# Patient Record
Sex: MALE | Employment: FULL TIME | URBAN - METROPOLITAN AREA
[De-identification: names, ages, dates, MRNs, and addresses within clinical notes are randomized per-mention and may not be internally consistent; named-entity substitution may affect disease eponyms.]

---

## 2023-07-26 ENCOUNTER — TELEPHONE (OUTPATIENT)
Age: 58
End: 2023-07-26

## 2023-07-26 NOTE — TELEPHONE ENCOUNTER
----- Message from Tiera Stewart sent at 7/26/2023 10:11 AM EDT -----  Subject: Message to Provider    QUESTIONS  Information for Provider? Pt was approved by provider to become a new pt,   his appt is scheduled for 8/4/23. He needs to have that appt rescheduled. I spoke to the  and she couldn't help me and said Diana would   need to handle the reschedule. I held but never got any answer and was   disconnected. Please call pt back to handle this. Thank you  ---------------------------------------------------------------------------  --------------  Estee SHARMA  [phone]; OK to leave message on voicemail  ---------------------------------------------------------------------------  --------------  SCRIPT ANSWERS  Relationship to Patient?  Self

## 2023-07-28 ENCOUNTER — TELEPHONE (OUTPATIENT)
Age: 58
End: 2023-07-28

## 2023-07-28 NOTE — TELEPHONE ENCOUNTER
----- Message from Kilo Sharma sent at 7/28/2023 11:45 AM EDT -----  Subject: Message to Provider    QUESTIONS  Information for Provider? returning call to the office to [employer].  please   return the call  ---------------------------------------------------------------------------  --------------  [address] [place] [address] Alex  [phone]; OK to leave message on voicemail  ---------------------------------------------------------------------------  --------------  SCRIPT ANSWERS  undefined